# Patient Record
Sex: MALE | Race: WHITE | Employment: PART TIME | URBAN - METROPOLITAN AREA
[De-identification: names, ages, dates, MRNs, and addresses within clinical notes are randomized per-mention and may not be internally consistent; named-entity substitution may affect disease eponyms.]

---
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
## 2018-01-11 ENCOUNTER — TELEPHONE (OUTPATIENT)
Dept: INTERNAL MEDICINE | Age: 18
End: 2018-01-11

## 2018-02-28 ENCOUNTER — TELEPHONE (OUTPATIENT)
Dept: INTERNAL MEDICINE | Age: 18
End: 2018-02-28

## 2018-03-05 ENCOUNTER — APPOINTMENT (OUTPATIENT)
Dept: GENERAL RADIOLOGY | Age: 18
End: 2018-03-05
Payer: MEDICARE

## 2018-03-05 ENCOUNTER — HOSPITAL ENCOUNTER (EMERGENCY)
Age: 18
Discharge: HOME | End: 2018-03-05
Attending: EMERGENCY MEDICINE
Payer: MEDICARE

## 2018-03-05 VITALS
HEART RATE: 61 BPM | SYSTOLIC BLOOD PRESSURE: 149 MMHG | DIASTOLIC BLOOD PRESSURE: 80 MMHG | OXYGEN SATURATION: 98 % | TEMPERATURE: 98.6 F | RESPIRATION RATE: 14 BRPM

## 2018-03-05 DIAGNOSIS — S90.01XD CONTUSION OF RIGHT ANKLE, SUBSEQUENT ENCOUNTER: Primary | ICD-10-CM

## 2018-03-05 PROCEDURE — 73610 X-RAY EXAM OF ANKLE: CPT

## 2018-03-05 PROCEDURE — 73630 X-RAY EXAM OF FOOT: CPT

## 2018-03-05 PROCEDURE — 99283 EMERGENCY DEPT VISIT LOW MDM: CPT

## 2018-03-05 PROCEDURE — 6370000000 HC RX 637 (ALT 250 FOR IP): Performed by: PHYSICIAN ASSISTANT

## 2018-03-05 RX ORDER — ACETAMINOPHEN 325 MG/1
650 TABLET ORAL ONCE
Status: COMPLETED | OUTPATIENT
Start: 2018-03-05 | End: 2018-03-05

## 2018-03-05 RX ADMIN — ACETAMINOPHEN 650 MG: 325 TABLET ORAL at 13:26

## 2018-03-05 ASSESSMENT — ENCOUNTER SYMPTOMS: ALLERGIC/IMMUNOLOGIC NEGATIVE: 1

## 2018-03-05 ASSESSMENT — PAIN DESCRIPTION - ORIENTATION: ORIENTATION: RIGHT

## 2018-03-05 ASSESSMENT — PAIN DESCRIPTION - LOCATION: LOCATION: ANKLE

## 2018-03-05 ASSESSMENT — PAIN SCALES - GENERAL
PAINLEVEL_OUTOF10: 9
PAINLEVEL_OUTOF10: 9

## 2018-03-05 ASSESSMENT — PAIN DESCRIPTION - PAIN TYPE: TYPE: ACUTE PAIN

## 2018-03-05 ASSESSMENT — PAIN DESCRIPTION - FREQUENCY: FREQUENCY: CONTINUOUS

## 2018-03-05 ASSESSMENT — PAIN DESCRIPTION - DESCRIPTORS: DESCRIPTORS: ACHING

## 2018-03-05 NOTE — ED PROVIDER NOTES
rate, regular rhythm, S1 normal, S2 normal, normal heart sounds and intact distal pulses. Exam reveals no gallop and no friction rub. No murmur heard. Pulses:       Radial pulses are 2+ on the right side, and 2+ on the left side. Pulmonary/Chest: Effort normal and breath sounds normal. No respiratory distress. He has no wheezes. He has no rales. Abdominal: Normal appearance. Musculoskeletal: Normal range of motion. He exhibits edema and tenderness. There is very slight ecchymosis surrounding the right lateral malleolus. Edema on the dorsum of the foot. Sensation is grossly intact in bilateral lower sure me dermatomes. Stanton Rolling 2+ DP and PT pulses bilaterally. Patient has full range of motion of bilateral ankles. Negative Portillo test bilaterally. No tenderness over the Achilles tendon bilaterally. .  No medial malleoli or tenderness of the right ankle. Patient has lateral malleoli or tenderness anterior to the lateral malleolus. No fifth metatarsal head tenderness. No arch tenderness. There is no tenderness in the right knee. There is no fibular head tenderness of the right knee. Patient has full flexion and extension of the right knee. Neurological: He is alert and oriented to person, place, and time. He has normal strength. Skin: Skin is warm and intact. Psychiatric: He has a normal mood and affect. His speech is normal and behavior is normal. Judgment and thought content normal.   Nursing note and vitals reviewed. DIFFERENTIAL  DIAGNOSIS   Ankle fracture. Contusion of right ankle. Contusion of right foot. Lis franc fracture.     PLAN (LABS / IMAGING / EKG):  Orders Placed This Encounter   Procedures    XR ANKLE RIGHT (MIN 3 VIEWS)    XR FOOT RIGHT (MIN 3 VIEWS)    Apply ice to affected area    Crutches    Air Cast       MEDICATIONS ORDERED:  Orders Placed This Encounter   Medications    acetaminophen (TYLENOL) tablet 650 mg       Controlled Substances Monitoring: DIAGNOSTIC RESULTS / EMERGENCY DEPARTMENT COURSE / MDM     RADIOLOGY:   I directly visualized (with the attending physician) the following  images and reviewed the radiologist interpretations:  Xr Ankle Right (min 3 Views)    Result Date: 3/5/2018  EXAMINATION: 3 VIEWS OF THE RIGHT ANKLE; 3 VIEWS OF THE RIGHT FOOT 3/5/2018 1:30 pm COMPARISON: None. HISTORY: ORDERING SYSTEM PROVIDED HISTORY: sharp stabbing pain anterior to the right lateral maleolus, worse when walking. had xray at [facility] urgent [facility] read as negative. TECHNOLOGIST PROVIDED HISTORY: Reason for exam:->sharp stabbing pain anterior to the right lateral maleolus, worse when walking. had xray at [facility] urgent [facility] read as negative. FINDINGS: Frontal, lateral, and oblique views of the ankle and foot are submitted for review. There is no evidence for acute fracture or dislocation. Os trigonum variant noted. Bony mineralization is normal.  The ankle mortise is maintained. Overlying soft tissues are unremarkable. No radiopaque foreign body identified. No acute osseus abnormality of the ankle or foot. No fracture or dislocation. Xr Foot Right (min 3 Views)    Result Date: 3/5/2018  EXAMINATION: 3 VIEWS OF THE RIGHT ANKLE; 3 VIEWS OF THE RIGHT FOOT 3/5/2018 1:30 pm COMPARISON: None. HISTORY: ORDERING SYSTEM PROVIDED HISTORY: sharp stabbing pain anterior to the right lateral maleolus, worse when walking. had xray at [facility] urgent [facility] read as negative. TECHNOLOGIST PROVIDED HISTORY: Reason for exam:->sharp stabbing pain anterior to the right lateral maleolus, worse when walking. had xray at [facility] urgent [facility] read as negative. FINDINGS: Frontal, lateral, and oblique views of the ankle and foot are submitted for review. There is no evidence for acute fracture or dislocation. Os trigonum variant noted. Bony mineralization is normal.  The ankle mortise is maintained. Overlying soft tissues are unremarkable.   No radiopaque foreign body identified. No acute osseus abnormality of the ankle or foot. No fracture or dislocation. LABS:  No results found for this visit on 03/05/18. EMERGENCY DEPARTMENT COURSE:  Second ER visit in 5 days. Will repeat imaging of the right ankle and x-ray of the right foot, Tylenol, ice, crutches  Air cast ordered to right ankle. ED imaging results discussed with the patient and his mother. They will continue naprosyn, add on otc tylenolThe discharge instructions and plan was discussed with the patient. All d/c medications were reviewed with patient and patient understands the risk/benefit of using the medications. All questions were asked and answered per pt report. Patient is in agreement with discharge plan. Pre-hypertension/Hypertension: The patient has been informed that they may have pre-hypertension or Hypertension based on a blood pressure reading in the emergency department. I recommend that the patient call the primary care provider listed on their discharge instructions or a physician of their choice this week to arrange follow up for further evaluation of possible pre-hypertension or Hypertension. PROCEDURES:  None    FINAL IMPRESSION      1.  Contusion of right ankle, subsequent encounter          DISPOSITION / PLAN     DISPOSITION Decision To Discharge  otc tylenol  Continue naprosyn  Return to ed as needed  Ace wrap, ICE, elevation  School note  crutches  PATIENT REFERRED TO:  Maryanne San MD  [place] [address]  [phone]    Go to   as scheduled    [facility]  [address] [facility] [address]  [phone]  Go to   As needed, If symptoms worsen      DISCHARGE MEDICATIONS:  New Prescriptions    No medications on file       Jayshree Garcia PA-C PA-C  Emergency Medicine Physician Assistant    (Please note that portions of this note were completed with a voice recognition program.  Efforts were made to edit the dictations but occasionally words are mis-transcribed.)     Jewel Gonzales PA-C  03/05/18 [id number]

## 2018-03-05 NOTE — ED PROVIDER NOTES
[facility]  Emergency Department  Faculty Attestation     I performed a history and physical examination of the patient and discussed management with the resident. I reviewed the residents note and agree with the documented findings and plan of care. Any areas of disagreement are noted on the chart. I was personally present for the key portions of any procedures. I have documented in the chart those procedures where I was not present during the key portions. I have reviewed the emergency nurses triage note. I agree with the chief complaint, past medical history, past surgical history, allergies, medications, social and family history as documented unless otherwise noted below. For Physician Assistant/ Nurse Practitioner cases/documentation I have personally evaluated this patient and have completed at least one if not all key elements of the E/M (history, physical exam, and MDM). Additional findings are as noted. Primary Care Physician:  Gabrielle Nobles MD    Screenings:  [unfilled]    CHIEF COMPLAINT       Chief Complaint   Patient presents with    Ankle Pain     pt c/o right ankle pain, went to urgent care on 3/2/2018 negative xray, swelling and bruising noted. RECENT VITALS:   Temp: 98.6 °F (37 °C),  Heart Rate: 61, Resp: 14, BP: (!) 149/80    LABS:  Labs Reviewed - No data to display    Radiology  XR ANKLE RIGHT (MIN 3 VIEWS)    (Results Pending)   XR FOOT RIGHT (MIN 3 VIEWS)    (Results Pending)         Attending Physician Additional  Notes    Patient had a contusion to the right lateral malleolus on the piece of wooden furniture 4 days ago. Since then he's been having pain with examination. There is minimal swelling and no bruising. There is no other injuries. On exam is obese hypertensive afebrile and nontoxic. Full range of motion of the knee. No proximal fibular tenderness. No medial malleolar tenderness.   There is mild interested anterior

## 2018-05-24 ENCOUNTER — HOSPITAL ENCOUNTER (EMERGENCY)
Age: 18
Discharge: HOME | End: 2018-05-24
Attending: EMERGENCY MEDICINE
Payer: MEDICARE

## 2018-05-24 VITALS
RESPIRATION RATE: 16 BRPM | DIASTOLIC BLOOD PRESSURE: 90 MMHG | SYSTOLIC BLOOD PRESSURE: 145 MMHG | TEMPERATURE: 98.1 F | HEART RATE: 70 BPM | OXYGEN SATURATION: 100 %

## 2018-05-24 DIAGNOSIS — L03.113 CELLULITIS OF RIGHT UPPER EXTREMITY: Primary | ICD-10-CM

## 2018-05-24 PROCEDURE — 6370000000 HC RX 637 (ALT 250 FOR IP): Performed by: NURSE PRACTITIONER

## 2018-05-24 PROCEDURE — 99282 EMERGENCY DEPT VISIT SF MDM: CPT

## 2018-05-24 RX ORDER — DOXYCYCLINE HYCLATE 100 MG
100 TABLET ORAL ONCE
Status: COMPLETED | OUTPATIENT
Start: 2018-05-24 | End: 2018-05-24

## 2018-05-24 RX ORDER — DOXYCYCLINE 100 MG/1
100 TABLET ORAL 2 TIMES DAILY
Qty: 13 TABLET | Refills: 0 | Status: SHIPPED | OUTPATIENT
Start: 2018-05-24 | End: 2018-05-31

## 2018-05-24 RX ADMIN — DOXYCYCLINE HYCLATE 100 MG: 100 TABLET, COATED ORAL at 10:28

## 2018-05-24 ASSESSMENT — PAIN DESCRIPTION - ONSET: ONSET: SUDDEN

## 2018-05-24 ASSESSMENT — PAIN DESCRIPTION - DESCRIPTORS: DESCRIPTORS: ACHING

## 2018-05-24 ASSESSMENT — ENCOUNTER SYMPTOMS: COLOR CHANGE: 1

## 2018-05-24 ASSESSMENT — PAIN DESCRIPTION - ORIENTATION: ORIENTATION: RIGHT;UPPER

## 2018-05-24 ASSESSMENT — PAIN DESCRIPTION - PROGRESSION: CLINICAL_PROGRESSION: GRADUALLY WORSENING

## 2018-05-24 ASSESSMENT — PAIN DESCRIPTION - PAIN TYPE: TYPE: ACUTE PAIN

## 2018-05-24 ASSESSMENT — PAIN DESCRIPTION - LOCATION: LOCATION: ARM

## 2021-04-15 ENCOUNTER — APPOINTMENT (OUTPATIENT)
Dept: GENERAL RADIOLOGY | Age: 21
End: 2021-04-15
Payer: MEDICAID

## 2021-04-15 ENCOUNTER — HOSPITAL ENCOUNTER (EMERGENCY)
Age: 21
Discharge: HOME | End: 2021-04-15
Attending: EMERGENCY MEDICINE
Payer: MEDICAID

## 2021-04-15 VITALS
HEART RATE: 73 BPM | TEMPERATURE: 97.9 F | SYSTOLIC BLOOD PRESSURE: 148 MMHG | RESPIRATION RATE: 18 BRPM | DIASTOLIC BLOOD PRESSURE: 95 MMHG | OXYGEN SATURATION: 99 %

## 2021-04-15 DIAGNOSIS — R11.2 NON-INTRACTABLE VOMITING WITH NAUSEA, UNSPECIFIED VOMITING TYPE: Primary | ICD-10-CM

## 2021-04-15 LAB
ABSOLUTE EOS #: 0.56 K/UL (ref 0–0.44)
ABSOLUTE IMMATURE GRANULOCYTE: 0.03 K/UL (ref 0–0.3)
ABSOLUTE LYMPH #: 2.48 K/UL (ref 1.1–3.7)
ABSOLUTE MONO #: 0.74 K/UL (ref 0.1–1.2)
ANION GAP SERPL CALCULATED.3IONS-SCNC: 10 MMOL/L (ref 9–17)
BASOPHILS # BLD: 1 % (ref 0–2)
BASOPHILS ABSOLUTE: 0.05 K/UL (ref 0–0.2)
BUN BLDV-MCNC: 10 MG/DL (ref 6–20)
BUN/CREAT BLD: ABNORMAL (ref 9–20)
CALCIUM SERPL-MCNC: 10 MG/DL (ref 8.6–10.4)
CHLORIDE BLD-SCNC: 103 MMOL/L (ref 98–107)
CO2: 26 MMOL/L (ref 20–31)
CREAT SERPL-MCNC: 0.72 MG/DL (ref 0.7–1.2)
DIFFERENTIAL TYPE: ABNORMAL
EOSINOPHILS RELATIVE PERCENT: 6 % (ref 1–4)
GFR AFRICAN AMERICAN: >60 ML/MIN
GFR NON-AFRICAN AMERICAN: >60 ML/MIN
GFR SERPL CREATININE-BSD FRML MDRD: ABNORMAL ML/MIN/{1.73_M2}
GFR SERPL CREATININE-BSD FRML MDRD: ABNORMAL ML/MIN/{1.73_M2}
GLUCOSE BLD-MCNC: 108 MG/DL (ref 70–99)
HCT VFR BLD CALC: 48.1 % (ref 40.7–50.3)
HEMOGLOBIN: 15.5 G/DL (ref 13–17)
IMMATURE GRANULOCYTES: 0 %
LYMPHOCYTES # BLD: 26 % (ref 24–43)
MCH RBC QN AUTO: 26.9 PG (ref 25.2–33.5)
MCHC RBC AUTO-ENTMCNC: 32.2 G/DL (ref 28.4–34.8)
MCV RBC AUTO: 83.4 FL (ref 82.6–102.9)
MONOCYTES # BLD: 8 % (ref 3–12)
NRBC AUTOMATED: 0 PER 100 WBC
PDW BLD-RTO: 13.3 % (ref 11.8–14.4)
PLATELET # BLD: 256 K/UL (ref 138–453)
PLATELET ESTIMATE: ABNORMAL
PMV BLD AUTO: 11.3 FL (ref 8.1–13.5)
POTASSIUM SERPL-SCNC: 4.1 MMOL/L (ref 3.7–5.3)
RBC # BLD: 5.77 M/UL (ref 4.21–5.77)
RBC # BLD: ABNORMAL 10*6/UL
SEG NEUTROPHILS: 59 % (ref 36–65)
SEGMENTED NEUTROPHILS ABSOLUTE COUNT: 5.52 K/UL (ref 1.5–8.1)
SODIUM BLD-SCNC: 139 MMOL/L (ref 135–144)
TROPONIN INTERP: NORMAL
TROPONIN T: NORMAL NG/ML
TROPONIN, HIGH SENSITIVITY: <6 NG/L (ref 0–22)
WBC # BLD: 9.4 K/UL (ref 3.5–11.3)
WBC # BLD: ABNORMAL 10*3/UL

## 2021-04-15 PROCEDURE — 85025 COMPLETE CBC W/AUTO DIFF WBC: CPT

## 2021-04-15 PROCEDURE — 96374 THER/PROPH/DIAG INJ IV PUSH: CPT

## 2021-04-15 PROCEDURE — 71045 X-RAY EXAM CHEST 1 VIEW: CPT

## 2021-04-15 PROCEDURE — 93005 ELECTROCARDIOGRAM TRACING: CPT

## 2021-04-15 PROCEDURE — 80048 BASIC METABOLIC PNL TOTAL CA: CPT

## 2021-04-15 PROCEDURE — 99284 EMERGENCY DEPT VISIT MOD MDM: CPT

## 2021-04-15 PROCEDURE — 84484 ASSAY OF TROPONIN QUANT: CPT

## 2021-04-15 PROCEDURE — 2500000003 HC RX 250 WO HCPCS: Performed by: STUDENT IN AN ORGANIZED HEALTH CARE EDUCATION/TRAINING PROGRAM

## 2021-04-15 RX ORDER — FAMOTIDINE 40 MG/1
40 TABLET, FILM COATED ORAL DAILY
Qty: 20 TABLET | Refills: 0 | Status: SHIPPED | OUTPATIENT
Start: 2021-04-15

## 2021-04-15 RX ADMIN — FAMOTIDINE 20 MG: 10 INJECTION, SOLUTION INTRAVENOUS at 13:04

## 2021-04-15 NOTE — ED PROVIDER NOTES
[facility] [address]  ED  eMERGENCY dEPARTMENT eNCOUnter   Attending Attestation     Pt Name: Leisa Gómez  MRN: [id number]  [place] 2000  Date of evaluation: 4/15/21       Leisa Gómez is a 23 y.o. male who presents with Emesis (Pt stated he's been throwing up since Monday. Pt states hes having chest pressure which is relieved when he throws up.)      History: Patient is with vomiting. Patient has he throws up in the morning when he wakes up. Patient says that he took some antacids that were given to him by his family and this made him feel better. Patient has no other complaints. No pain at this time. No shortness of breath, no sweating, no chest pain or abdominal pain. Patient eating and drinking normally, using the restroom normally as well. Exam: Heart rate and rhythm are regular. Lungs are clear auscultation bilaterally. Abdomen is soft, nontender. Patient is awake, alert, acting appropriately. EKG shows normal sinus bradycardia at a rate of 54 bpm.  No ST elevation depression. T wave inversion in lead III. Flattening in aVF. T waves otherwise upright. No blocks or arrhythmias. Patient states that during his EKG he had just got his IV which made him scared and I believe he vagal down and had some bradycardia and with this. Patient has normal pulse while I am in the room. Labs obtained. If negative plan for discharge with antacid. Recommend following with PCP. I performed a history and physical examination of the patient and discussed management with the resident. I reviewed the residents note and agree with the documented findings and plan of care. Any areas of disagreement are noted on the chart. I was personally present for the key portions of any procedures. I have documented in the chart those procedures where I was not present during the key portions. I have personally reviewed all images and agree with the resident's interpretation.  I have reviewed the emergency nurses triage note. I agree with the chief complaint, past medical history, past surgical history, allergies, medications, social and family history as documented unless otherwise noted below. Documentation of the HPI, Physical Exam and Medical Decision Making performed by medical students or scribes is based on my personal performance of the HPI, PE and MDM. For Phys Assistant/ Nurse Practitioner cases/documentation I have had a face to face evaluation of this patient and have completed at least one if not all key elements of the E/M (history, physical exam, and MDM). Additional findings are as noted. For APC cases I have personally evaluated and examined the patient in conjunction with the APC and agree with the treatment plan and disposition of the patient as recorded by the APC.     Ashutosh Quezada MD  Attending Emergency  Physician       Iraida Camacho MD  04/15/21 DIANA Cartagena MD  04/15/21 1259

## 2021-04-15 NOTE — PROGRESS NOTES
I signed up for this patient in error. I did not evaluate or participate in the care of this patient.      Chandrika Shi,   Emergency Medicine Resident

## 2021-04-15 NOTE — DISCHARGE INSTR - COC
Continuity of Care Form    Patient Name: Jennifer Corrigan   :  2000  MRN:  [id number]    Admit date:  4/15/2021  Discharge date:  ***    Code Status Order: Prior   Advance Directives:     Admitting Physician:  No admitting provider for patient encounter. PCP: No primary care provider on file. Discharging Nurse: [facility] Unit/Room#: 35/35  Discharging Unit Phone Number: ***    Emergency Contact:   Extended Emergency Contact Information  Primary Emergency Contact: Cady Maldonado  Address: [address] [place] [address], [address] [place] [address] Phone: [phone]  Relation: Parent  Secondary Emergency Contact: Natanael Maldonado  Address: [address] [facility], [address] [place] [address]  Home Phone: [phone]  Relation: Parent    Past Surgical History:  Past Surgical History:   Procedure Laterality Date    CIRCUMCISION         Immunization History:   Immunization History   Administered Date(s) Administered    Influenza, Quadv, 6 mo and older, IM (Fluzone, Flulaval) 11/10/2017    Meningococcal MCV4O (Menveo) 2017    Tdap (Boostrix, Adacel) 2017       Active Problems:  Patient Active Problem List   Diagnosis Code    [facility] E66.01    Open wound of right forearm S51.801A       Isolation/Infection:   Isolation          No Isolation        Patient Infection Status     Infection Onset Added Last Indicated Last Indicated By Review Planned Expiration Resolved Resolved By    MRSA  10/12/17 10/12/17 Alivia Abbasi RN        [facility] 10/2017          Nurse Assessment:  Last Vital Signs: BP (!) 148/95   Pulse 73   Temp 97.9 °F (36.6 °C) (Oral)   Resp 18   SpO2 99%     Last documented pain score (0-10 scale):    Last Weight:   Wt Readings from Last 1 Encounters:   17 (!) 310 lb (140.6 kg) (>99 %, Z= 3.15)*     * Growth percentiles are based on CDC (Boys, 2-20 Years) data.      Mental Status:  {IP PT MENTAL STATUS:}    IV Access:  [address] [facility] IV ZJJTZQ:462728057}    Nursing Mobility/ADLs:  Walking   {CHP DME NFAU:582721926}  Transfer  {CHP DME IVLQ:635964730}  Bathing  {CHP DME XYEU:299827266}  Dressing  {CHP DME ACTU:339165438}  Toileting  {CHP DME GRAA:468692934}  Feeding  {CHP DME KCPA:304678948}  Med Admin  {P DME BCEI:289168400}  Med Delivery   {[facility] MED Delivery:801911074}    Wound Care Documentation and Therapy:  Wound 10/18/17 [facility] Wound # 1 Right Elbow  (10/3/17- Infection/cellulitis) (Active)   Number of days:         Elimination:  Continence:   · Bowel: {YES / BV:64286}  · Bladder: {YES / NJ:39099}  Urinary Catheter: {Urinary Catheter:491018261}   Colostomy/Ileostomy/Ileal Conduit: {YES / OW:02001}       Date of Last BM: ***  No intake or output data in the 24 hours ending 04/15/21 1410  No intake/output data recorded.     Safety Concerns:     [address] [email or web address] Safety Concerns:274129938}    Impairments/Disabilities:      [address] [email or web address] Impairments/Disabilities:129147559}    Nutrition Therapy:  Current Nutrition Therapy:   [address] [email or web address] Diet List:796938710}    Routes of Feeding: {P DME Other Feedings:743898915}  Liquids: {Slp liquid thickness:63862}  Daily Fluid Restriction: {P DME Yes amt example:025443001}  Last Modified Barium Swallow with Video (Video Swallowing Test): {Done Not Done ACWY:892851786}    Treatments at the Time of Hospital Discharge:   Respiratory Treatments: ***  Oxygen Therapy:  {Therapy; copd oxygen:46407}  Ventilator:    {[facility] Vent AOUP:114347955}    Rehab Therapies: {THERAPEUTIC INTERVENTION:1974402054}  Weight Bearing Status/Restrictions: [address] [employer]}  Other Medical Equipment (for information only, NOT a DME order):  {EQUIPMENT:288626825}  Other Treatments: ***    Patient's personal belongings (please select all that are sent with patient):  {[facility] DME Belongings:588372698}    RN SIGNATURE:  {Esignature:069101482}    CASE MANAGEMENT/SOCIAL WORK SECTION    Inpatient Status Date: ***    Readmission Risk Assessment

## 2021-04-15 NOTE — ED NOTES
The following labs labeled with pt sticker and tubed:     [x] Lavender   [] on ice   [x] Blue   [x] Green/yellow  [] Green/black [] on ice  [] Pink  [] Red  [] Yellow     [] COVID-19 swab    [] Rapid     [] Urine Sample  [] Pelvic Cultures    [] Blood Cultures              Elvina Kawasaki  04/15/21 1623

## 2021-04-15 NOTE — ED NOTES
Patient arrived ambulatory to the ER for complaints of vomiting. States he woke up to get ready for work, ate [employer] and felt nauseous and had chest pain afterwards. Patient made himself throw up and felt better afterwards. Same thing has been happening for the past 3 days. Patient is eating okay, passing flatus okay, and last bowel movement was yesterday. Also has family history of GERD.       [facility]  04/15/21 2997

## 2021-04-15 NOTE — ED PROVIDER NOTES
[facility] ED  Emergency Department Encounter  Emergency Medicine Resident     Pt Name: Lulu Lane  MRN: [id number]  [place] 2000  Date of evaluation: 4/15/21  PCP:  No primary care provider on file. Chief Complaint     Chief Complaint   Patient presents with    Emesis     Pt stated he's been throwing up since Monday. Pt states hes having chest pressure which is relieved when he throws up. History of present illness (HPI)  (Location/Symptom, Timing/Onset, Context/Setting, Quality, Duration, Modifying Factors, Severity.)      Lulu Lane is a 23 y.o. male history of obesity as well as an open wound of the right forearm with a history of MRSA who presents with concerns for nausea and vomiting since Monday as well as chest pressure is relieved with vomitus. Patient states that symptoms began on Monday, states that in the morning she will wake up having a feeling as though there is a bubble in the stomach, states that he does not have overt pain but vomiting states that he feels better. She currently has no chest pain at this time, patient is taking antacids with almost complete relief of symptoms, has a family history of gastroesophageal reflux disease, states that he has no symptoms at this given time but would like to be evaluated. Patient has no lightheadedness, dizziness, chest pain or dyspnea. No change in bowel or bladder function. Past medical / surgical/ social/ family history      Past Medical Hx:   Patient has no medical problems   has a past medical history of MRSA (methicillin resistant staph aureus) culture positive and Obesity. Past Surgical Hx:  Patient has had no surgeries   has a past surgical history that includes Circumcision.     Social Hx:  Social History     Socioeconomic History    Marital status: Single     Spouse name: Not on file    Number of children: Not on file    Years of education: Not on file    Highest education level: Not on file   Occupational History    Not on file   Social Needs    Financial resource strain: Not on file    Food insecurity     Worry: Not on file     Inability: Not on file    Transportation needs     Medical: Not on file     Non-medical: Not on file   Tobacco Use    Smoking status: Never Smoker    Smokeless tobacco: Never Used   Substance and Sexual Activity    Alcohol use: No    Drug use: No    Sexual activity: Never   Lifestyle    Physical activity     Days per week: Not on file     Minutes per session: Not on file    Stress: Not on file   Relationships    Social connections     Talks on phone: Not on file     Gets together: Not on file     Attends Yazidism service: Not on file     Active member of club or organization: Not on file     Attends meetings of clubs or organizations: Not on file     Relationship status: Not on file    Intimate partner violence     Fear of current or ex partner: Not on file     Emotionally abused: Not on file     Physically abused: Not on file     Forced sexual activity: Not on file   Other Topics Concern    Not on file   Social History Narrative    Not on file       Family Hx:  No relevant family history is reported  Family History   Problem Relation Age of Onset    Depression Mother     Depression Brother     Diabetes Maternal Grandmother        Allergies:    No known medication allergies  Pcn [penicillins] and Sulfa antibiotics    Home Medications: The patient takes no medications  Prior to Admission medications    Medication Sig Start Date End Date Taking? Authorizing Provider   famotidine (PEPCID) 40 MG tablet Take 1 tablet by mouth daily 4/15/21  Yes Colonel Justo MD       Review of systems  (2-9 systems for level 4, 10 or more for level 5)      CONSTITUTIONAL: Denies recent fever, chills  EYES: No visual changes. NECK: No midline neck pain  RESPIRATORY: Denies shortness of breath. Denies dyspnea. CARDIAC: Denies  chest pain at this time.    GI: Denies abdominal pain Denies  nausea, endorses remote history of vomiting. Denies Blood in the stool or black tarry stools. : Denies dysuria  MUSCULOSKELETAL: Denies focal weakness. NEUROLOGICAL: denies headache or focal weakness. SKIN:  Denies any rash. Physical exam  (up to 7 for level 4, 8 or more for level 5)      BP (!) 148/95   Pulse 73   Temp 97.9 °F (36.6 °C) (Oral)   Resp 18   SpO2 99%     GENERAL APPEARANCE: AxOx4, generally well-appearing. no acute distress. HEENT: Normocephalic and atraumatic. Mucus membranes moist. EOMI, clear conjunctiva, oropharynx clear. NECK: Supple without lymphadenopathy. No stiffness or restricted ROM. HEART: Normal rate and regular rhythm, normal S1/S1, no m/r/g   LUNGS: clear to auscultation without wheezes or rales, good air movement  ABDOMEN: Soft, nontender, nondistended with good bowel sounds heard. BACK: No CVAT, no obvious deformity. EXTREMITIES: Without cyanosis, clubbing or edema. NEUROLOGICAL: Grossly nonfocal. Alert and oriented, moving all 4 extremities. CN not formally tested but appear grossly intact. SKIN: Warm and dry without any rash.     Plan     DIAGNOSTIC ORDERS:  Orders Placed This Encounter   Procedures    XR CHEST PORTABLE    Troponin    BASIC METABOLIC PANEL    CBC WITH AUTO DIFFERENTIAL    EKG 12 Lead       MEDICATION ORDERS:  Orders Placed This Encounter   Medications    famotidine (PEPCID) injection 20 mg    famotidine (PEPCID) 40 MG tablet     Sig: Take 1 tablet by mouth daily     Dispense:  20 tablet     Refill:  0       Differential diagnosis      Sinus Bradycardia DDX:  Acute hypoglycemia, hypothermia, hypothyroidism, myxedema coma, obstructive sleep apnea    Cardiac Arrest DDX:   Acute Coronary Thrombosis, Pulmonary Embolism,Toxin, Trauma, Tamponade, Tension Pneumothorax, Hypovolemia, Acidosis, Hyperkalemia, Hypothermia, Hypoxia, Hypoglycemia    Etiology for Cardiac dysrhythmia differential  Idiopathic, anemia, anxiety, dehydration, electrolyte abnormalities, fever or sepsis, hyperthyroidism, hypoglycemia, ischemia, metabolic disorders, pain, toxicologic exposure, PE, respiratory etiologies, shock, trauma, withdrawal syndromes    Chest Pain DDX:   Emergent: ACS/NSTEMI/STEMI/angina, arrhythmia, trauma, aortic dissection,  PE, PNA, pneumothroax, esophageal rupture, tamponade, Cocaine use, Coronary artery dissection  Nonemergent: pneumonia, pericarditis, GERD, MSK, Endocarditis, anxiety    Evaluated for: diaphoresis, present chest pain, tachypnea, BP both arms, heart sounds, JVD, tender chest wall, wheezing    Very low risk for spontaneous coronary artery dissection in women between 40 and 55 as the patient does not have connective tissue disorders, inflammatory disorders, or recent pregnancy. Hypertension DDX:   HTN evaluate (acute EOD -- brain, renal, thoracic aorta, lung, kidney, eyes)   (renal dx, vascular lesion, drugs (MAO inhibitor + tyramine, steroids, cocaine, amphetamines)    Lightheaded/ Dizzy DDX:   Vertigo: Peripheral (BPPV, labyrinthitis, Meniere's) Central: (MS, Acoustic neuroma, carotid artery dissection). Lightheaded: Serious (cardiac valve/ arrhytmia, anemia, low glucose, infection), Common (orthostatic, nonspecific)     Shortness of Breath DDX:   sob/wheezing/cough evaluate for COPD exacerbation (home O2, PNA, hospitalization), asthma (past intubation, hospitalization, tobacco history), Pneumothorax, anaphylaxis, anxiety, PE (FH, OCP, tobacco use, BMI, immobilization, recent surgery, cancer, past DVT/ PE), pericardial effusion, CHF, ACS/MI, atelectasis, lower airway obstruction, aspiration, sudden onset, fever, cough, leg swelling.     Syncope/ Pre-syncope DDX:   cardiac arrhythmia/ valvular, low glucose, anemia, SAH, dissection, PE, AAA rupture, ectopic pregnancy rupture, seizure, vasovagal, orthostatic    Sinus Tachycardia DDX:  Fever, hypovolemia, hypotension with shock, sepsis, anemia, hypoxia, PE, ACS, AMI, pain, anxiety, pheochromocytoma, hyperthyroidism, decompensated heart failure, pulmonary disease, sympathomimetics, anticholinergics, illicit drug use, beta blocker withdrawal, alcohol withdrawl    Diagnostic Results     LABS:  Results for orders placed or performed during the hospital encounter of 04/15/21   Troponin   Result Value Ref Range    Troponin, High Sensitivity <6 0 - 22 ng/L    Troponin T NOT REPORTED <0.03 ng/mL    Troponin Interp NOT REPORTED    BASIC METABOLIC PANEL   Result Value Ref Range    Glucose 108 (H) 70 - 99 mg/dL    BUN 10 6 - 20 mg/dL    CREATININE 0.72 0.70 - 1.20 mg/dL    Bun/Cre Ratio NOT REPORTED 9 - 20    Calcium 10.0 8.6 - 10.4 mg/dL    Sodium 139 135 - 144 mmol/L    Potassium 4.1 3.7 - 5.3 mmol/L    Chloride 103 98 - 107 mmol/L    CO2 26 20 - 31 mmol/L    Anion Gap 10 9 - 17 mmol/L    GFR Non-African American >60 >60 mL/min    GFR African American >60 >60 mL/min    GFR Comment          GFR Staging NOT REPORTED    CBC WITH AUTO DIFFERENTIAL   Result Value Ref Range    WBC 9.4 3.5 - 11.3 k/uL    RBC 5.77 4.21 - 5.77 m/uL    Hemoglobin 15.5 13.0 - 17.0 g/dL    Hematocrit 48.1 40.7 - 50.3 %    MCV 83.4 82.6 - 102.9 fL    MCH 26.9 25.2 - 33.5 pg    MCHC 32.2 28.4 - 34.8 g/dL    RDW 13.3 11.8 - 14.4 %    Platelets 008 576 - 074 k/uL    MPV 11.3 8.1 - 13.5 fL    NRBC Automated 0.0 0.0 per 100 WBC    Differential Type NOT REPORTED     Seg Neutrophils 59 36 - 65 %    Lymphocytes 26 24 - 43 %    Monocytes 8 3 - 12 %    Eosinophils % 6 (H) 1 - 4 %    Basophils 1 0 - 2 %    Immature Granulocytes 0 0 %    Segs Absolute 5.52 1.50 - 8.10 k/uL    Absolute Lymph # 2.48 1.10 - 3.70 k/uL    Absolute Mono # 0.74 0.10 - 1.20 k/uL    Absolute Eos # 0.56 (H) 0.00 - 0.44 k/uL    Basophils Absolute 0.05 0.00 - 0.20 k/uL    Absolute Immature Granulocyte 0.03 0.00 - 0.30 k/uL    WBC Morphology NOT REPORTED     RBC Morphology NOT REPORTED     Platelet Estimate NOT REPORTED        RADIOLOGY:  XR CHEST Patient was given a prescription of Pepcid, told to follow-up with primary care provider. HEART Risk Score for Chest Pain Patients        1  Score 0 - 3 = 2.5%  MACE over next 6 wks = Discharge home  Score 4 - 6 = 20.3%  MACE over next 6 wks = Obs admit  Score 7 - 10 = 72.7%  MACE over next 6 wks = Early invasive Rx    Chest Pain in the Emergency Room: A Multicenter Validation of the [address] [place] BE, Ankur AJ, [place] ANGELY, Demi TP, [employer], Demi EG, Brie SH, [facility]. Crit Pathw Cardiol. 2010 Sep; 9(3): 164-169. \"A prospective validation of the HEART score for chest pain patients at the emergency department. \" Int J Cardiol. 2013 Oct 3;168(3):2153-8. doi: 10.1016/j.ijcard. 2013.01.255. Epub 2013 Mar 7. Procedures     None    Final impression      1.  Non-intractable vomiting with nausea, unspecified vomiting type           Disposition with plan     Disposition: Decision To Discharge    PATIENT REFERRED TO:  [facility]  [facility]  [phone]    As needed      DISCHARGE MEDICATIONS:  New Prescriptions    FAMOTIDINE (PEPCID) 40 MG TABLET    Take 1 tablet by mouth daily         Berry Webb MD  PGY-2, Emergency Medicine   [facility]. [facility]     (Please note that portions of this note were completed with a voice recognition program.  Efforts were made to edit the dictations but occasionally words are mis-transcribed.)       Chuck Valencia MD  Resident  04/15/21 9655

## 2021-04-26 LAB
EKG ATRIAL RATE: 54 BPM
EKG P AXIS: 32 DEGREES
EKG P-R INTERVAL: 152 MS
EKG Q-T INTERVAL: 430 MS
EKG QRS DURATION: 84 MS
EKG QTC CALCULATION (BAZETT): 407 MS
EKG R AXIS: 12 DEGREES
EKG T AXIS: 5 DEGREES
EKG VENTRICULAR RATE: 54 BPM

## 2021-05-11 ENCOUNTER — HOSPITAL ENCOUNTER (EMERGENCY)
Age: 21
Discharge: HOME | End: 2021-05-11
Attending: EMERGENCY MEDICINE
Payer: COMMERCIAL

## 2021-05-11 DIAGNOSIS — S93.401A SPRAIN OF RIGHT ANKLE, UNSPECIFIED LIGAMENT, INITIAL ENCOUNTER: Primary | ICD-10-CM

## 2021-05-11 PROCEDURE — 99281 EMR DPT VST MAYX REQ PHY/QHP: CPT

## 2021-05-11 RX ORDER — IBUPROFEN 600 MG/1
600 TABLET ORAL 3 TIMES DAILY PRN
Qty: 30 TABLET | Refills: 0 | Status: SHIPPED | OUTPATIENT
Start: 2021-05-11

## 2021-05-11 ASSESSMENT — ENCOUNTER SYMPTOMS
DIARRHEA: 0
COLOR CHANGE: 0
BLOOD IN STOOL: 0
NAUSEA: 0
RHINORRHEA: 0
SHORTNESS OF BREATH: 0
VOMITING: 0
WHEEZING: 0
SINUS PRESSURE: 0
ABDOMINAL PAIN: 0
COUGH: 0
SINUS PAIN: 0
CHEST TIGHTNESS: 0
CONSTIPATION: 0

## 2021-05-11 NOTE — ED PROVIDER NOTES
completed with a voice recognition program. Efforts were made to edit the dictations but occasionally words are mis-transcribed.)             Sy Pearl MD  05/11/21 4339

## 2021-05-11 NOTE — ED PROVIDER NOTES
of club or organization: Not on file     Attends meetings of clubs or organizations: Not on file     Relationship status: Not on file    Intimate partner violence     Fear of current or ex partner: Not on file     Emotionally abused: Not on file     Physically abused: Not on file     Forced sexual activity: Not on file   Other Topics Concern    Not on file   Social History Narrative    Not on file       Family History   Problem Relation Age of Onset    Depression Mother     Depression Brother     Diabetes Maternal Grandmother         Allergies:  Pcn [penicillins] and Sulfa antibiotics    Home Medications:  Prior to Admission medications    Medication Sig Start Date End Date Taking? Authorizing Provider   ibuprofen (ADVIL;MOTRIN) 600 MG tablet Take 1 tablet by mouth 3 times daily as needed for Pain 5/11/21  Yes Oneil Rodrigez MD   famotidine (PEPCID) 40 MG tablet Take 1 tablet by mouth daily 4/15/21   Stella Whitaker MD       REVIEW OFSYSTEMS    (2-9 systems for level 4, 10 or more for level 5)      Review of Systems   Constitutional: Negative for appetite change, chills, fatigue and fever. HENT: Negative for ear discharge, ear pain, rhinorrhea, sinus pressure and sinus pain. Respiratory: Negative for cough, chest tightness, shortness of breath and wheezing. Cardiovascular: Negative for chest pain and palpitations. Gastrointestinal: Negative for abdominal pain, blood in stool, constipation, diarrhea, nausea and vomiting. Genitourinary: Negative for dysuria and flank pain. Musculoskeletal: Positive for gait problem and joint swelling. Negative for neck pain and neck stiffness. Right ankle pain   Skin: Negative for color change and wound. Neurological: Negative for dizziness, light-headedness, numbness and headaches.        PHYSICAL EXAM   (up to 7 for level 4, 8 or more forlevel 5)      INITIAL VITALS:   ED Triage Vitals   BP Temp Temp src Pulse Resp SpO2 Height Weight   -- -- -- -- -- -- -- --       Physical Exam  Vitals signs and nursing note reviewed. Constitutional:       Appearance: Normal appearance. He is well-developed. HENT:      Head: Normocephalic and atraumatic. Cardiovascular:      Rate and Rhythm: Normal rate and regular rhythm. Heart sounds: Normal heart sounds. Pulmonary:      Effort: Pulmonary effort is normal. No respiratory distress. Breath sounds: Normal breath sounds. No wheezing. Musculoskeletal:      Right ankle: He exhibits swelling. He exhibits normal range of motion, no deformity, no laceration and normal pulse. Tenderness. Lateral malleolus tenderness found. No medial malleolus and no head of 5th metatarsal tenderness found. Achilles tendon exhibits no pain. Skin:     General: Skin is warm and dry. Neurological:      Mental Status: He is alert. DIFFERENTIAL  DIAGNOSIS     PLAN (LABS / IMAGING / EKG):  Orders Placed This Encounter   Procedures    [employer] Ankle Brace, Right; Crutches; Pair, Right Side Injury; Med (5'2\"-5'10\")       MEDICATIONS ORDERED:  Orders Placed This Encounter   Medications    ibuprofen (ADVIL;MOTRIN) 600 MG tablet     Sig: Take 1 tablet by mouth 3 times daily as needed for Pain     Dispense:  30 tablet     Refill:  0       DDX: Ankle sprain, ankle fracture    Initial MDM/Plan: 23 y.o. male who presents with ankle pain after running. Likely ankle sprain. Recommend rest, ice, compression, elevation. No indication for imaging at this time    DIAGNOSTIC RESULTS / EMERGENCYDEPARTMENT COURSE / MDM     LABS:  Labs Reviewed - No data to display      RADIOLOGY:  No results found. EKG  Not done    All EKG's are interpreted by the Emergency Department Physicianwho either signs or Co-signs this chart in the absence of a cardiologist.    PROCEDURES:  None    CONSULTS:  None    CRITICAL CARE:  Please see attending note    FINAL IMPRESSION      1.  Sprain of right ankle, unspecified ligament, initial encounter          DISPOSITION / PLAN     DISPOSITION        PATIENT REFERRED TO:  No follow-up provider specified.     DISCHARGE MEDICATIONS:  New Prescriptions    IBUPROFEN (ADVIL;MOTRIN) 600 MG TABLET    Take 1 tablet by mouth 3 times daily as needed for Pain       Jelly Rogers MD  Emergency Medicine Resident    (Please note that portions of this note were completed with a voice recognition program.Efforts were made to edit the dictations but occasionally words are mis-transcribed.)       Jelly Rogers MD  Resident  05/11/21 4244

## 2021-05-11 NOTE — ED NOTES
Patient given crutches, ankle splint, and discharge instructions per orders. Pt has no other complaints at this time.       Gilberto Alan RN  05/11/21 9918

## 2024-05-22 ENCOUNTER — HOSPITAL ENCOUNTER (EMERGENCY)
Age: 24
Discharge: HOME | End: 2024-05-22
Attending: EMERGENCY MEDICINE
Payer: COMMERCIAL

## 2024-05-22 VITALS
TEMPERATURE: 97.3 F | HEART RATE: 54 BPM | SYSTOLIC BLOOD PRESSURE: 135 MMHG | OXYGEN SATURATION: 100 % | DIASTOLIC BLOOD PRESSURE: 80 MMHG | RESPIRATION RATE: 17 BRPM

## 2024-05-22 DIAGNOSIS — S86.912A STRAIN OF LEFT KNEE, INITIAL ENCOUNTER: Primary | ICD-10-CM

## 2024-05-22 PROCEDURE — 99283 EMERGENCY DEPT VISIT LOW MDM: CPT

## 2024-05-22 PROCEDURE — 6370000000 HC RX 637 (ALT 250 FOR IP): Performed by: EMERGENCY MEDICINE

## 2024-05-22 RX ORDER — ACETAMINOPHEN 500 MG
1000 TABLET ORAL ONCE
Status: COMPLETED | OUTPATIENT
Start: 2024-05-22 | End: 2024-05-22

## 2024-05-22 RX ORDER — IBUPROFEN 400 MG/1
400 TABLET ORAL EVERY 6 HOURS PRN
Qty: 21 TABLET | Refills: 0 | Status: SHIPPED | OUTPATIENT
Start: 2024-05-22

## 2024-05-22 RX ADMIN — ACETAMINOPHEN 1000 MG: 500 TABLET ORAL at 09:06

## 2024-05-22 ASSESSMENT — PAIN DESCRIPTION - PAIN TYPE: TYPE: ACUTE PAIN

## 2024-05-22 ASSESSMENT — PAIN - FUNCTIONAL ASSESSMENT: PAIN_FUNCTIONAL_ASSESSMENT: 0-10

## 2024-05-22 ASSESSMENT — PAIN SCALES - GENERAL: PAINLEVEL_OUTOF10: 9

## 2024-05-22 ASSESSMENT — PAIN DESCRIPTION - LOCATION: LOCATION: KNEE

## 2024-05-22 ASSESSMENT — PAIN DESCRIPTION - ORIENTATION: ORIENTATION: LEFT

## 2024-05-22 NOTE — DISCHARGE INSTRUCTIONS
[facility] ED Clinic List    Healthcare Providers Services Day of Week/ Hours   [place] [facility]  [address] [facility]  [phone] Pediatric Primary Care  Adult Primary Care  OB/GYN/Prenatal/Specialty Clinics Monday - Friday  8:00a - 4:30p   [facility]  [address]  [phone] Adult Medicine, Pediatrics, OB/GYN Monday - Friday  8:30a - 4:30p   [facility] - Surgery  [address] [facility]  [phone]  Wednesday 8:30a - 11:00a   [facility]  [address] Adult Internal Medicine   ([facility])  [phone]  OB/GYN Clinic  [phone]    Pediatric Clinic  [phone]   Monday, Tuesday, Thursday, Friday  8:00a - 4:30p  Wednesday 1:00p - 4:30p  Monday, Tuesday, Thursday 8:00a - 5:00p;  Friday 8:00a - 12:30p  Wednesday 1p - 4p  Monday, Tuesday, Thursday, Friday  8:30a - 4:15p  Wednesday 12:30p - 4:15p   [place] [address] [facility]  [phone] Pediatric Primary Care  Adult Primary Care  OB/Prenatal Monday - Wednesday, Friday Monday - Friday 8a - 12p  Thursday 8a - 4:45p   [facility]  [address] [address]  [phone]  [address] [place] [address] [address] [phone] Pregnancy  Pre & Post Adoption Counseling  Pregnancy Support  Prenatal / nutrition Care  Reward Incentive Program [facility]  Mon, Tues, Wed, Fri 10:00a - 4:30p  Thur 10:00a - 7:30p  [place] Location  Monday - Friday 10a - 4:30p   [facility]   OB/GYN  [address]  [phone]  Adult Internal Medicine  [address] [facility]  [phone]  Pediatrics  [address] [facility]  [phone]  Neuro / Headache  [address] [facility],   [address]  [phone] Monday - Friday  8:30a - 5p   [facility]  [address] [facility]  [phone] Family Practice Mon, Tues, Thurs, Fri  9:00a - 4:30p  Wed 1:00p - 4:30p   [facility]  [address] [address]  [phone] Family  only   [facility]  [address]  [phone] Patient must be homeless, call for   eligibility guidelines.  Under age 18 NOT accepted Days and hours vary (Doors open at 8:30a - day of week varies)  Call for an appointment   Miscellaneous Information     [facility] First Call for Help  [phone]  Call for an appointment   H.E.L.P   (Hospital Eligibility Link Program)  [phone]   toll free [phone] For financial assistance        If you notice any concerning symptoms please return to the ER immediately. These can include but are not limited to: fevers, chills, shortness of breath, vomiting, weakness of the extremities, changes in your mental status, numbness, pale extremities, or chest pain.     THANK YOU!!!    From [facility] Emergency Department    On behalf of the Emergency Department staff at [facility]'s Emergency Department, I would like to thank you for giving [facility] the opportunity to address your health care needs and concerns.    We hope that during your visit, our service was delivered in a professional and caring manner. Please keep [facility] in mind as we walk with you down the path to your own personal wellness.     Please expect an automated phone call from [phone] so we can ask a few questions about your health and progress. Based on your answers, a clinician may call you back to offer help and instructions.

## 2024-05-22 NOTE — ED PROVIDER NOTES
[facility]  EMERGENCY DEPARTMENT ENCOUNTER      PtName: Iraj Maldonado  MRN: [id number]  Birthdate 2000  Date of evaluation: 5/22/24  Note Started: 9:03 AM EDT      HISTORY OF PRESENT ILLNESS     Iraj Maldonado is a 23 y.o. male who presents left knee pain.  Patient states he was running after his sibling when he twisted his knee.  He did not fall or hit the knee.  Complained of pain to the knee.  He took 200 mg of Motrin at 5 AM this morning.  The patient called off of work this morning.    PAST MEDICAL HISTORY    has a past medical history of MRSA (methicillin resistant staph aureus) culture positive and Obesity.    SURGICAL HISTORY      has a past surgical history that includes Circumcision.    CURRENT MEDICATIONS       Previous Medications    FAMOTIDINE (PEPCID) 40 MG TABLET    Take 1 tablet by mouth daily       ALLERGIES     is allergic to pcn [penicillins] and sulfa antibiotics.    FAMILY HISTORY     He indicated that the status of his mother is unknown. He indicated that the status of his brother is unknown. He indicated that the status of his maternal grandmother is unknown.     family history includes Depression in his brother and mother; Diabetes in his maternal grandmother.    SOCIAL HISTORY      reports that he has never smoked. He has never used smokeless tobacco. He reports that he does not drink alcohol and does not use drugs.    PHYSICAL EXAM     INITIAL VITALS:  oral temperature is 97.3 °F (36.3 °C). His blood pressure is 135/80 and his pulse is 54. His respiration is 17 and oxygen saturation is 100%.      Physical Exam  Vitals reviewed.   Musculoskeletal:         General: Normal range of motion.      Comments: Minimal tenderness over the lateral inferior aspect of the left knee.  Patella is not ballotable.  There is no erythema or warmth noted.  Full range of motion is demonstrated.  Negative valgus and varus stress.  Negative Lachman.   Neurological:      Mental

## 2024-05-22 NOTE — ED NOTES
Pt. Arrives to ED via private auto for c/o left knee pain.  Pt states he was chasing after his nephew yesterday when he stepped wrong and hurt his left knee.  Pt states he has been having pain rated 9/10 since then and has taken IBU without relief.  No obvious swelling or redness was noted upon inspection and pt has full ROM of limb.  Upon arrival pt is ambulatory to room 35, A&O X4, and speaking in clear sentences.

## 2025-01-31 ENCOUNTER — HOSPITAL ENCOUNTER (EMERGENCY)
Age: 25
Discharge: HOME | End: 2025-01-31
Attending: EMERGENCY MEDICINE
Payer: COMMERCIAL

## 2025-01-31 ENCOUNTER — APPOINTMENT (OUTPATIENT)
Dept: GENERAL RADIOLOGY | Age: 25
End: 2025-01-31
Attending: EMERGENCY MEDICINE
Payer: COMMERCIAL

## 2025-01-31 VITALS
WEIGHT: 275 LBS | HEIGHT: 75 IN | SYSTOLIC BLOOD PRESSURE: 146 MMHG | DIASTOLIC BLOOD PRESSURE: 90 MMHG | RESPIRATION RATE: 16 BRPM | OXYGEN SATURATION: 98 % | HEART RATE: 57 BPM | BODY MASS INDEX: 34.19 KG/M2 | TEMPERATURE: 98.1 F

## 2025-01-31 DIAGNOSIS — S86.912A STRAIN OF LEFT KNEE, INITIAL ENCOUNTER: Primary | ICD-10-CM

## 2025-01-31 PROCEDURE — 73562 X-RAY EXAM OF KNEE 3: CPT

## 2025-01-31 PROCEDURE — 99283 EMERGENCY DEPT VISIT LOW MDM: CPT

## 2025-01-31 ASSESSMENT — PAIN - FUNCTIONAL ASSESSMENT: PAIN_FUNCTIONAL_ASSESSMENT: NONE - DENIES PAIN

## 2025-01-31 NOTE — ED PROVIDER NOTES
[facility] EMERGENCY DEPARTMENT  EMERGENCY DEPARTMENT ENCOUNTER      Pt Name: Iraj Maldonado  MRN: [id number]  Birthdate 2000  Date of evaluation: 1/31/25      CHIEF COMPLAINT       Chief Complaint   Patient presents with    Knee Pain     Lt knee pain and a lump     HISTORY OF PRESENT ILLNESS   24 y.o. male presents with c/o non traumatic left knee pain.  Symptoms started about a week ago.  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  He has been having pain in his left knee for about a week.  It'll pop, which makes it feel a lot better.  Pain is described as stabbing in character, severe in severity (rating it 10 / 10).  The pain is located primarily in the left patella with  radiation throughout the knee.  The pain has been constant in course.  The patient tried tylenol / motrin prior to arrival with minimal relief of symptoms.   The patient denies a history of similar symptoms / injury to that knee.  The patient denies any other injury.    REVIEW OF SYSTEMS     Review of Systems   Constitutional: Negative for fever   Musculoskeletal: Positive for arthralgia, and gait impairment.   Skin: Negative for rash or wound.   Neurological: Negative for weakness or numbness.     PAST MEDICAL HISTORY     Past Medical History:   Diagnosis Date    MRSA (methicillin resistant staph aureus) culture positive 10/09/2017    elbow    Obesity        SURGICAL HISTORY       Past Surgical History:   Procedure Laterality Date    CIRCUMCISION         CURRENT MEDICATIONS       Discharge Medication List as of 1/31/2025 10:48 AM        CONTINUE these medications which have NOT CHANGED    Details   ibuprofen (IBU) 400 MG tablet Take 1 tablet by mouth every 6 hours as needed for Pain, Disp-21 tablet, R-0Print      famotidine (PEPCID) 40 MG tablet Take 1 tablet by mouth daily, Disp-20 tablet, R-0Print             ALLERGIES     is allergic to pcn [penicillins] and sulfa antibiotics.    FAMILY HISTORY     He indicated that the status of

## 2025-08-02 ENCOUNTER — HOSPITAL ENCOUNTER (EMERGENCY)
Age: 25
Discharge: HOME | End: 2025-08-02
Attending: EMERGENCY MEDICINE
Payer: COMMERCIAL

## 2025-08-02 VITALS
WEIGHT: 267.2 LBS | TEMPERATURE: 97.5 F | HEART RATE: 58 BPM | BODY MASS INDEX: 33.85 KG/M2 | DIASTOLIC BLOOD PRESSURE: 86 MMHG | SYSTOLIC BLOOD PRESSURE: 145 MMHG | RESPIRATION RATE: 16 BRPM | OXYGEN SATURATION: 99 %

## 2025-08-02 DIAGNOSIS — H66.91 RIGHT OTITIS MEDIA, UNSPECIFIED OTITIS MEDIA TYPE: ICD-10-CM

## 2025-08-02 DIAGNOSIS — R11.2 NAUSEA AND VOMITING, UNSPECIFIED VOMITING TYPE: Primary | ICD-10-CM

## 2025-08-02 PROCEDURE — 99283 EMERGENCY DEPT VISIT LOW MDM: CPT

## 2025-08-02 RX ORDER — ONDANSETRON 4 MG/1
4 TABLET, FILM COATED ORAL 3 TIMES DAILY PRN
Qty: 30 TABLET | Refills: 0 | Status: SHIPPED | OUTPATIENT
Start: 2025-08-02

## 2025-08-02 RX ORDER — AZITHROMYCIN 500 MG/1
TABLET, FILM COATED ORAL
Qty: 2 TABLET | Refills: 0 | Status: SHIPPED | OUTPATIENT
Start: 2025-08-02 | End: 2025-08-05

## 2025-08-02 RX ORDER — ONDANSETRON 4 MG/1
4 TABLET, ORALLY DISINTEGRATING ORAL ONCE
Status: DISCONTINUED | OUTPATIENT
Start: 2025-08-02 | End: 2025-08-02

## 2025-08-02 NOTE — ED PROVIDER NOTES
Not on file   Stress: Not on file   Social Connections: Not on file   Intimate Partner Violence: Not on file   Housing Stability: Not on file       Family History   Problem Relation Age of Onset    Depression Mother     Depression Brother     Diabetes Maternal Grandmother        Allergies:  Pcn [penicillins] and Sulfa antibiotics    Home Medications:  Prior to Admission medications    Medication Sig Start Date End Date Taking? Authorizing Provider   azithromycin (ZITHROMAX) 500 MG tablet Take 1 tablet by mouth daily for 1 day, THEN 0.5 tablets daily for 2 days. 8/2/25 8/5/25 Yes Prakash Briggs MD   ondansetron (ZOFRAN) 4 MG tablet Take 1 tablet by mouth 3 times daily as needed for Nausea or Vomiting 8/2/25  Yes Prakash Briggs MD   ibuprofen (IBU) 400 MG tablet Take 1 tablet by mouth every 6 hours as needed for Pain 5/22/24   Uriel Cohen MD   famotidine (PEPCID) 40 MG tablet Take 1 tablet by mouth daily 4/15/21   Uriel Rondon MD         REVIEW OF SYSTEMS       Review of Systems    Nausea, vomiting       PHYSICAL EXAM      INITIAL VITALS:   BP (!) 145/86   Pulse 58   Temp 97.5 °F (36.4 °C)   Resp 16   Wt 121.2 kg (267 lb 3.2 oz)   SpO2 99%   BMI 33.85 kg/m²     Physical Exam  Constitutional:       Appearance: Normal appearance.   HENT:      Head: Normocephalic and atraumatic.      Left Ear: Tympanic membrane normal.      Ears:      Comments: R TM erythematous.      Mouth/Throat:      Mouth: Mucous membranes are moist.      Pharynx: Posterior oropharyngeal erythema present.   Eyes:      Extraocular Movements: Extraocular movements intact.      Pupils: Pupils are equal, round, and reactive to light.   Cardiovascular:      Rate and Rhythm: Normal rate and regular rhythm.   Pulmonary:      Effort: Pulmonary effort is normal.      Breath sounds: Normal breath sounds.   Abdominal:      General: Abdomen is flat.      Palpations: Abdomen is soft.      Tenderness: There is no abdominal tenderness.  MEDICATIONS:  New Prescriptions    AZITHROMYCIN (ZITHROMAX) 500 MG TABLET    Take 1 tablet by mouth daily for 1 day, THEN 0.5 tablets daily for 2 days.    ONDANSETRON (ZOFRAN) 4 MG TABLET    Take 1 tablet by mouth 3 times daily as needed for Nausea or Vomiting       Prakash Briggs MD  Emergency Medicine Resident    (Please note that portions of this note were completed with a voice recognition program.  Efforts were made to edit the dictations but occasionally words are mis-transcribed.)

## 2025-08-02 NOTE — DISCHARGE INSTRUCTIONS
You were seen in the ED today for nausea and vomiting.   We switched the antibiotic you are taking for your ear infection.   You can use Zyrtec or Claritin-D for congestion. It is an over the counter medicine.     Take your medication as indicated and prescribed.  Avoid drinking alcohol or drinks that have caffeine it.  Drink plenty of water or fluids like Gatorade to keep yourself hydrated.  You should eat bland foods like bananas, rice, apple sauce and toast / crackers.    PLEASE RETURN TO THE EMERGENCY DEPARTMENT IMMEDIATELY for worsening symptoms, increase in the amount of diarrhea you are having, abdominal pain, blood in your stool, vomiting up blood, persistent nausea and/or vomiting, or if you develop any concerning symptoms such as: high fever not relieved by acetaminophen (Tylenol) and/or ibuprofen (Motrin / Advil), chills, shortness of breath, chest pain, feeling of your heart fluttering or racing, loss of consciousness, numbness, weakness or tingling in the arms or legs or change in color of the extremities, changes in mental status, persistent headache, blurry vision, loss of bladder / bowel control, unable to follow up with your physician, or other any other care or concern.

## 2025-08-02 NOTE — ED NOTES
Pt to ED via triage with complaints of ear pain, he was seen and treated for this but is not tolerating his doxycycline due to emesis after taking it. Pt states he has no other complaints or needs.

## 2025-08-07 NOTE — ED PROVIDER NOTES
[facility]     Emergency Department     Faculty Attestation    I performed a history and physical examination of the patient and discussed management with the resident. I reviewed the resident’s note and agree with the documented findings and plan of care. Any areas of disagreement are noted on the chart. I was personally present for the key portions of any procedures. I have documented in the chart those procedures where I was not present during the key portions. I have reviewed the emergency nurses triage note. I agree with the chief complaint, past medical history, past surgical history, allergies, medications, social and family history as documented unless otherwise noted below.        For Physician Assistant/ Nurse Practitioner cases/documentation I have personally evaluated this patient and have completed at least one if not all key elements of the E/M (history, physical exam, and MDM). Additional findings are as noted.  I have personally seen and evaluated the patient.  I find the patient's history and physical exam are consistent with the NP/PA documentation.  I agree with the care provided, treatment rendered, disposition and follow-up plan.          Critical Care     Sage Collazo M.D.  Attending Emergency  Physician            Sage Collazo MD  08/07/25 [id number]